# Patient Record
Sex: FEMALE | Race: ASIAN | NOT HISPANIC OR LATINO | URBAN - METROPOLITAN AREA
[De-identification: names, ages, dates, MRNs, and addresses within clinical notes are randomized per-mention and may not be internally consistent; named-entity substitution may affect disease eponyms.]

---

## 2022-08-22 ENCOUNTER — INPATIENT (INPATIENT)
Facility: HOSPITAL | Age: 42
LOS: 2 days | Discharge: HOME | End: 2022-08-25
Attending: OBSTETRICS & GYNECOLOGY | Admitting: OBSTETRICS & GYNECOLOGY
Payer: COMMERCIAL

## 2022-08-22 VITALS — HEART RATE: 76 BPM | SYSTOLIC BLOOD PRESSURE: 177 MMHG | DIASTOLIC BLOOD PRESSURE: 103 MMHG

## 2022-08-22 DIAGNOSIS — Z3A.00 WEEKS OF GESTATION OF PREGNANCY NOT SPECIFIED: ICD-10-CM

## 2022-08-22 DIAGNOSIS — Z98.890 OTHER SPECIFIED POSTPROCEDURAL STATES: Chronic | ICD-10-CM

## 2022-08-22 DIAGNOSIS — Z3A.35 35 WEEKS GESTATION OF PREGNANCY: ICD-10-CM

## 2022-08-22 DIAGNOSIS — Z34.80 ENCOUNTER FOR SUPERVISION OF OTHER NORMAL PREGNANCY, UNSPECIFIED TRIMESTER: ICD-10-CM

## 2022-08-22 DIAGNOSIS — O26.899 OTHER SPECIFIED PREGNANCY RELATED CONDITIONS, UNSPECIFIED TRIMESTER: ICD-10-CM

## 2022-08-22 LAB
ALBUMIN SERPL ELPH-MCNC: 3.8 G/DL — SIGNIFICANT CHANGE UP (ref 3.3–5)
ALBUMIN SERPL ELPH-MCNC: 3.9 G/DL — SIGNIFICANT CHANGE UP (ref 3.3–5)
ALP SERPL-CCNC: 124 U/L — HIGH (ref 40–120)
ALP SERPL-CCNC: 124 U/L — HIGH (ref 40–120)
ALT FLD-CCNC: 14 U/L — SIGNIFICANT CHANGE UP (ref 10–45)
ALT FLD-CCNC: 14 U/L — SIGNIFICANT CHANGE UP (ref 10–45)
ANION GAP SERPL CALC-SCNC: 14 MMOL/L — SIGNIFICANT CHANGE UP (ref 5–17)
ANION GAP SERPL CALC-SCNC: 14 MMOL/L — SIGNIFICANT CHANGE UP (ref 5–17)
APPEARANCE UR: CLEAR — SIGNIFICANT CHANGE UP
APTT BLD: 25.6 SEC — LOW (ref 27.5–35.5)
APTT BLD: 25.7 SEC — LOW (ref 27.5–35.5)
AST SERPL-CCNC: 14 U/L — SIGNIFICANT CHANGE UP (ref 10–40)
AST SERPL-CCNC: 15 U/L — SIGNIFICANT CHANGE UP (ref 10–40)
BASOPHILS # BLD AUTO: 0.02 K/UL — SIGNIFICANT CHANGE UP (ref 0–0.2)
BASOPHILS # BLD AUTO: 0.05 K/UL — SIGNIFICANT CHANGE UP (ref 0–0.2)
BASOPHILS NFR BLD AUTO: 0.1 % — SIGNIFICANT CHANGE UP (ref 0–2)
BASOPHILS NFR BLD AUTO: 0.4 % — SIGNIFICANT CHANGE UP (ref 0–2)
BILIRUB SERPL-MCNC: 0.2 MG/DL — SIGNIFICANT CHANGE UP (ref 0.2–1.2)
BILIRUB SERPL-MCNC: 0.2 MG/DL — SIGNIFICANT CHANGE UP (ref 0.2–1.2)
BILIRUB UR-MCNC: NEGATIVE — SIGNIFICANT CHANGE UP
BLD GP AB SCN SERPL QL: NEGATIVE — SIGNIFICANT CHANGE UP
BUN SERPL-MCNC: 10 MG/DL — SIGNIFICANT CHANGE UP (ref 7–23)
BUN SERPL-MCNC: 12 MG/DL — SIGNIFICANT CHANGE UP (ref 7–23)
CALCIUM SERPL-MCNC: 10.2 MG/DL — SIGNIFICANT CHANGE UP (ref 8.4–10.5)
CALCIUM SERPL-MCNC: 8.5 MG/DL — SIGNIFICANT CHANGE UP (ref 8.4–10.5)
CHLORIDE SERPL-SCNC: 101 MMOL/L — SIGNIFICANT CHANGE UP (ref 96–108)
CHLORIDE SERPL-SCNC: 103 MMOL/L — SIGNIFICANT CHANGE UP (ref 96–108)
CO2 SERPL-SCNC: 20 MMOL/L — LOW (ref 22–31)
CO2 SERPL-SCNC: 21 MMOL/L — LOW (ref 22–31)
COLOR SPEC: COLORLESS — SIGNIFICANT CHANGE UP
COVID-19 SPIKE DOMAIN AB INTERP: POSITIVE
COVID-19 SPIKE DOMAIN ANTIBODY RESULT: >250 U/ML — HIGH
CREAT ?TM UR-MCNC: 18 MG/DL — SIGNIFICANT CHANGE UP
CREAT SERPL-MCNC: 0.53 MG/DL — SIGNIFICANT CHANGE UP (ref 0.5–1.3)
CREAT SERPL-MCNC: 0.67 MG/DL — SIGNIFICANT CHANGE UP (ref 0.5–1.3)
DIFF PNL FLD: NEGATIVE — SIGNIFICANT CHANGE UP
EGFR: 114 ML/MIN/1.73M2 — SIGNIFICANT CHANGE UP
EGFR: 121 ML/MIN/1.73M2 — SIGNIFICANT CHANGE UP
EOSINOPHIL # BLD AUTO: 0 K/UL — SIGNIFICANT CHANGE UP (ref 0–0.5)
EOSINOPHIL # BLD AUTO: 0.12 K/UL — SIGNIFICANT CHANGE UP (ref 0–0.5)
EOSINOPHIL NFR BLD AUTO: 0 % — SIGNIFICANT CHANGE UP (ref 0–6)
EOSINOPHIL NFR BLD AUTO: 1 % — SIGNIFICANT CHANGE UP (ref 0–6)
FIBRINOGEN PPP-MCNC: 602 MG/DL — HIGH (ref 330–520)
FIBRINOGEN PPP-MCNC: 634 MG/DL — HIGH (ref 330–520)
GLUCOSE BLDC GLUCOMTR-MCNC: 135 MG/DL — HIGH (ref 70–99)
GLUCOSE BLDC GLUCOMTR-MCNC: 140 MG/DL — HIGH (ref 70–99)
GLUCOSE BLDC GLUCOMTR-MCNC: 145 MG/DL — HIGH (ref 70–99)
GLUCOSE BLDC GLUCOMTR-MCNC: 164 MG/DL — HIGH (ref 70–99)
GLUCOSE BLDC GLUCOMTR-MCNC: 81 MG/DL — SIGNIFICANT CHANGE UP (ref 70–99)
GLUCOSE SERPL-MCNC: 176 MG/DL — HIGH (ref 70–99)
GLUCOSE SERPL-MCNC: 70 MG/DL — SIGNIFICANT CHANGE UP (ref 70–99)
GLUCOSE UR QL: NEGATIVE — SIGNIFICANT CHANGE UP
HCT VFR BLD CALC: 39.6 % — SIGNIFICANT CHANGE UP (ref 34.5–45)
HCT VFR BLD CALC: 40.5 % — SIGNIFICANT CHANGE UP (ref 34.5–45)
HGB BLD-MCNC: 12.9 G/DL — SIGNIFICANT CHANGE UP (ref 11.5–15.5)
HGB BLD-MCNC: 13.2 G/DL — SIGNIFICANT CHANGE UP (ref 11.5–15.5)
IMM GRANULOCYTES NFR BLD AUTO: 0.7 % — SIGNIFICANT CHANGE UP (ref 0–1.5)
IMM GRANULOCYTES NFR BLD AUTO: 0.8 % — SIGNIFICANT CHANGE UP (ref 0–1.5)
INR BLD: 0.9 RATIO — SIGNIFICANT CHANGE UP (ref 0.88–1.16)
INR BLD: 0.92 RATIO — SIGNIFICANT CHANGE UP (ref 0.88–1.16)
KETONES UR-MCNC: NEGATIVE — SIGNIFICANT CHANGE UP
LDH SERPL L TO P-CCNC: 175 U/L — SIGNIFICANT CHANGE UP (ref 50–242)
LDH SERPL L TO P-CCNC: 185 U/L — SIGNIFICANT CHANGE UP (ref 50–242)
LEUKOCYTE ESTERASE UR-ACNC: NEGATIVE — SIGNIFICANT CHANGE UP
LYMPHOCYTES # BLD AUTO: 0.92 K/UL — LOW (ref 1–3.3)
LYMPHOCYTES # BLD AUTO: 1.95 K/UL — SIGNIFICANT CHANGE UP (ref 1–3.3)
LYMPHOCYTES # BLD AUTO: 16.7 % — SIGNIFICANT CHANGE UP (ref 13–44)
LYMPHOCYTES # BLD AUTO: 6.7 % — LOW (ref 13–44)
MAGNESIUM SERPL-MCNC: 5 MG/DL — HIGH (ref 1.6–2.6)
MCHC RBC-ENTMCNC: 30 PG — SIGNIFICANT CHANGE UP (ref 27–34)
MCHC RBC-ENTMCNC: 30.1 PG — SIGNIFICANT CHANGE UP (ref 27–34)
MCHC RBC-ENTMCNC: 32.6 GM/DL — SIGNIFICANT CHANGE UP (ref 32–36)
MCHC RBC-ENTMCNC: 32.6 GM/DL — SIGNIFICANT CHANGE UP (ref 32–36)
MCV RBC AUTO: 92 FL — SIGNIFICANT CHANGE UP (ref 80–100)
MCV RBC AUTO: 92.3 FL — SIGNIFICANT CHANGE UP (ref 80–100)
MONOCYTES # BLD AUTO: 0.06 K/UL — SIGNIFICANT CHANGE UP (ref 0–0.9)
MONOCYTES # BLD AUTO: 1.02 K/UL — HIGH (ref 0–0.9)
MONOCYTES NFR BLD AUTO: 0.4 % — LOW (ref 2–14)
MONOCYTES NFR BLD AUTO: 8.7 % — SIGNIFICANT CHANGE UP (ref 2–14)
NEUTROPHILS # BLD AUTO: 12.68 K/UL — HIGH (ref 1.8–7.4)
NEUTROPHILS # BLD AUTO: 8.47 K/UL — HIGH (ref 1.8–7.4)
NEUTROPHILS NFR BLD AUTO: 72.5 % — SIGNIFICANT CHANGE UP (ref 43–77)
NEUTROPHILS NFR BLD AUTO: 92 % — HIGH (ref 43–77)
NITRITE UR-MCNC: NEGATIVE — SIGNIFICANT CHANGE UP
NRBC # BLD: 0 /100 WBCS — SIGNIFICANT CHANGE UP (ref 0–0)
NRBC # BLD: 0 /100 WBCS — SIGNIFICANT CHANGE UP (ref 0–0)
PH UR: 6.5 — SIGNIFICANT CHANGE UP (ref 5–8)
PLATELET # BLD AUTO: 330 K/UL — SIGNIFICANT CHANGE UP (ref 150–400)
PLATELET # BLD AUTO: 338 K/UL — SIGNIFICANT CHANGE UP (ref 150–400)
POTASSIUM SERPL-MCNC: 4.1 MMOL/L — SIGNIFICANT CHANGE UP (ref 3.5–5.3)
POTASSIUM SERPL-MCNC: 4.8 MMOL/L — SIGNIFICANT CHANGE UP (ref 3.5–5.3)
POTASSIUM SERPL-SCNC: 4.1 MMOL/L — SIGNIFICANT CHANGE UP (ref 3.5–5.3)
POTASSIUM SERPL-SCNC: 4.8 MMOL/L — SIGNIFICANT CHANGE UP (ref 3.5–5.3)
PROT ?TM UR-MCNC: 21 MG/DL — HIGH (ref 0–12)
PROT SERPL-MCNC: 6.9 G/DL — SIGNIFICANT CHANGE UP (ref 6–8.3)
PROT SERPL-MCNC: 7.1 G/DL — SIGNIFICANT CHANGE UP (ref 6–8.3)
PROT UR-MCNC: ABNORMAL
PROT/CREAT UR-RTO: 1.2 RATIO — HIGH (ref 0–0.2)
PROTHROM AB SERPL-ACNC: 10.3 SEC — LOW (ref 10.5–13.4)
PROTHROM AB SERPL-ACNC: 10.6 SEC — SIGNIFICANT CHANGE UP (ref 10.5–13.4)
RBC # BLD: 4.29 M/UL — SIGNIFICANT CHANGE UP (ref 3.8–5.2)
RBC # BLD: 4.4 M/UL — SIGNIFICANT CHANGE UP (ref 3.8–5.2)
RBC # FLD: 12.9 % — SIGNIFICANT CHANGE UP (ref 10.3–14.5)
RBC # FLD: 13 % — SIGNIFICANT CHANGE UP (ref 10.3–14.5)
RH IG SCN BLD-IMP: POSITIVE — SIGNIFICANT CHANGE UP
RH IG SCN BLD-IMP: POSITIVE — SIGNIFICANT CHANGE UP
SARS-COV-2 IGG+IGM SERPL QL IA: >250 U/ML — HIGH
SARS-COV-2 IGG+IGM SERPL QL IA: POSITIVE
SODIUM SERPL-SCNC: 135 MMOL/L — SIGNIFICANT CHANGE UP (ref 135–145)
SODIUM SERPL-SCNC: 138 MMOL/L — SIGNIFICANT CHANGE UP (ref 135–145)
SP GR SPEC: 1.01 — LOW (ref 1.01–1.02)
T PALLIDUM AB TITR SER: NEGATIVE — SIGNIFICANT CHANGE UP
URATE SERPL-MCNC: 3.6 MG/DL — SIGNIFICANT CHANGE UP (ref 2.5–7)
URATE SERPL-MCNC: 4.3 MG/DL — SIGNIFICANT CHANGE UP (ref 2.5–7)
UROBILINOGEN FLD QL: NEGATIVE — SIGNIFICANT CHANGE UP
WBC # BLD: 11.69 K/UL — HIGH (ref 3.8–10.5)
WBC # BLD: 13.79 K/UL — HIGH (ref 3.8–10.5)
WBC # FLD AUTO: 11.69 K/UL — HIGH (ref 3.8–10.5)
WBC # FLD AUTO: 13.79 K/UL — HIGH (ref 3.8–10.5)

## 2022-08-22 RX ORDER — MAGNESIUM SULFATE 500 MG/ML
2 VIAL (ML) INJECTION
Qty: 40 | Refills: 0 | Status: DISCONTINUED | OUTPATIENT
Start: 2022-08-22 | End: 2022-08-25

## 2022-08-22 RX ORDER — SODIUM CHLORIDE 9 MG/ML
1000 INJECTION, SOLUTION INTRAVENOUS
Refills: 0 | Status: DISCONTINUED | OUTPATIENT
Start: 2022-08-22 | End: 2022-08-23

## 2022-08-22 RX ORDER — LABETALOL HCL 100 MG
300 TABLET ORAL
Refills: 0 | Status: DISCONTINUED | OUTPATIENT
Start: 2022-08-22 | End: 2022-08-25

## 2022-08-22 RX ORDER — LABETALOL HCL 100 MG
200 TABLET ORAL ONCE
Refills: 0 | Status: COMPLETED | OUTPATIENT
Start: 2022-08-22 | End: 2022-08-22

## 2022-08-22 RX ORDER — SODIUM CHLORIDE 9 MG/ML
1000 INJECTION INTRAMUSCULAR; INTRAVENOUS; SUBCUTANEOUS
Refills: 0 | Status: DISCONTINUED | OUTPATIENT
Start: 2022-08-22 | End: 2022-08-23

## 2022-08-22 RX ORDER — AMPICILLIN TRIHYDRATE 250 MG
1 CAPSULE ORAL EVERY 4 HOURS
Refills: 0 | Status: DISCONTINUED | OUTPATIENT
Start: 2022-08-22 | End: 2022-08-23

## 2022-08-22 RX ORDER — LABETALOL HCL 100 MG
20 TABLET ORAL ONCE
Refills: 0 | Status: COMPLETED | OUTPATIENT
Start: 2022-08-22 | End: 2022-08-22

## 2022-08-22 RX ORDER — MAGNESIUM SULFATE 500 MG/ML
4 VIAL (ML) INJECTION ONCE
Refills: 0 | Status: COMPLETED | OUTPATIENT
Start: 2022-08-22 | End: 2022-08-22

## 2022-08-22 RX ORDER — CITRIC ACID/SODIUM CITRATE 300-500 MG
15 SOLUTION, ORAL ORAL EVERY 6 HOURS
Refills: 0 | Status: DISCONTINUED | OUTPATIENT
Start: 2022-08-22 | End: 2022-08-23

## 2022-08-22 RX ORDER — AMPICILLIN TRIHYDRATE 250 MG
2 CAPSULE ORAL ONCE
Refills: 0 | Status: COMPLETED | OUTPATIENT
Start: 2022-08-22 | End: 2022-08-22

## 2022-08-22 RX ORDER — OXYTOCIN 10 UNIT/ML
333.33 VIAL (ML) INJECTION
Qty: 20 | Refills: 0 | Status: DISCONTINUED | OUTPATIENT
Start: 2022-08-22 | End: 2022-08-25

## 2022-08-22 RX ORDER — CHLORHEXIDINE GLUCONATE 213 G/1000ML
1 SOLUTION TOPICAL ONCE
Refills: 0 | Status: DISCONTINUED | OUTPATIENT
Start: 2022-08-22 | End: 2022-08-23

## 2022-08-22 RX ADMIN — Medication 50 GM/HR: at 14:39

## 2022-08-22 RX ADMIN — Medication 300 GRAM(S): at 14:12

## 2022-08-22 RX ADMIN — Medication 12 MILLIGRAM(S): at 14:24

## 2022-08-22 RX ADMIN — Medication 200 MILLIGRAM(S): at 14:26

## 2022-08-22 RX ADMIN — SODIUM CHLORIDE 125 MILLILITER(S): 9 INJECTION, SOLUTION INTRAVENOUS at 14:22

## 2022-08-22 RX ADMIN — Medication 108 GRAM(S): at 22:57

## 2022-08-22 RX ADMIN — Medication 20 MILLIGRAM(S): at 13:40

## 2022-08-22 RX ADMIN — Medication 200 GRAM(S): at 18:57

## 2022-08-22 NOTE — OB PROVIDER H&P - PROBLEM SELECTOR PLAN 1
- Admit to L & D/labs/IVF/NPO/HELLP labs  - Fetal status - NST/TOCO  - GBS uknown  - Betamethasone for FLM  - Maternal Status - Pt with CHTN currently on labetalol 100mg BID last dose this am at 9am, Severe range BP x 2--> Labetalol 20mg IVP given, pt also given labetalol 200mg PO and standing dose changed to labetalol 300mg BID  - Will start magnesium prophylaxis, HELLP labs ordered, serial BP's  - Covid swab ordered  - Consents to be signed  D/W  Dr Tara Benjamin PA-C

## 2022-08-22 NOTE — OB PROVIDER H&P - ATTENDING COMMENTS
Att  41 y0 P0 at 35.3 wks, cHTN managed with Labetalol, sent from office for elev BP and found to have to severe range BP on admission.  No symptoms assoc with BP, reassuring fetal status.  GS today showed concern for SGA with suboptimal growth.  BP's have normalized with anti-HTN meds and labs are normal.  Magnesium started for Sz prophylaxis, and steroids given for FLM.  Reviewed case informally with MFM team, and d/w pt.  Rec stay in hosp for BP mngmt.  Second dose steroids tomorrow.  If BPs remain normal on new regimen and labs remain normal and pt asymptomatic, will then review pros/cons cont observation sec to  GA vs induction of labor sec to concern for siPEC.   Pt expressed understanding.  JERRY Pacheco Att  41 y0 P0 at 35.3 wks, cHTN managed with Labetalol, sent from office for elev BP and found to have to severe range BP on admission.  No symptoms assoc with BP, reassuring fetal status.  GS today showed concern for SGA with suboptimal growth.  BP's have normalized with anti-HTN meds and labs are normal.  Magnesium started for Sz prophylaxis, and steroids given for FLM.  Reviewed case informally with MFM team, and d/w pt.  Rec stay in hosp for BP mngmt.  Second dose steroids tomorrow.  If BPs remain normal on new regimen and labs remain normal and pt asymptomatic, will then review pros/cons cont observation sec to  GA vs induction of labor sec to concern for siPEC.   Pt expressed understanding.  JERRY Pacheco    Addendum:  Labs normal x P/C ration 1.2.  In this setting rec to begin induction tonight as opposed to waiting, pt expressed understanding.

## 2022-08-22 NOTE — OB RN PATIENT PROFILE - CURRENT PREGNANCY COMPLICATIONS, OB PROFILE
Hypertensive Disorder Gestational Diabetes/Gestational Age less than 36 Weeks/Hypertensive Disorder/Intrauterine Growth Restriction/Maternal Unknown GBS

## 2022-08-22 NOTE — CHART NOTE - NSCHARTNOTEFT_GEN_A_CORE
BSUS vtx  SVE 0/0/-3, exam limited 2/2 vaginismus     Plan of care d/w patient to eat meal and then proceed with IOL considering siPEC with PC 1.2 (previously in March 0.17)    d/w Dr. Tara Braun PGY3

## 2022-08-22 NOTE — CHART NOTE - NSCHARTNOTEFT_GEN_A_CORE
Patient discussed during safety rounds. Currently admitted for siPEC on Mg ppx w/ PNC also c/b GDMA1. Patient had elevated FS both prior to induction and soon after beginning induction of labor. Given that the patient received BMZ earlier today and ate dinner prior to starting induction, there is an explanation for elevated FS. However, fetal exposure to inc blood sugar still has implications. After discussion with safety officers and MFM Fellow on call, will proceed with q1hr FS x3 and should FS normalized (<120) will continue with routine policy. Should FS be above threshold, will proceed with insulin drip as per policy.     Mvula PGY3

## 2022-08-22 NOTE — OB PROVIDER H&P - HISTORY OF PRESENT ILLNESS
41y P0   @  35w 3d   presents from office for evaluation secondary to h/o CHTN on labetalol 100mg BID, with BP today in the office of 150/103.  Per pt over the past week she has had a few elevated BP's at home, denies HA, visual changes or epigastric pain  Denies contractions, VB or LOF has + FM    PNC: Dr Pacheco  PNI: CHTN - diagnosed  during the 1st trimester and started on labetalol 100mg BID at that time  GDMA1 - well controlled per patient  IUGR - Ultrasound today showed IUGR 4lbs 12 oz 7%, AC 4% suboptimal growth  PNL: GBS unknown      All: No Known Allergies  Meds: Labetalol 100mg BID, ASA  PMHx: CHTN, GDMA1  PSHx: LSC ov cystectomy  Socialhx: Denies x 3  OBhx: Primigravid  GYNhx: h/o ov cysts, denies fibroids, or STDs    HR: 75 (08-22-22 @ 13:54) (66 - 84)  BP: 141/95 (08-22-22 @ 13:48) (141/95 - 180/108)  SpO2: 99% (08-22-22 @ 13:54) (98% - 100%)  Gen: NAD  Heart: RRR  Lungs: CTA B/L  Abdomen: Gravid, NT  Ext: no calf tenderness    NST: 140's reactive  TOCO:rare ctx  VE: deferred  EFW: 4lbs 12 oz      A/P 41y P0   @  35w 3 d  Admitted for CHTN with new onset severe range BP's suspect SIPEC  - Admit to L & D/labs/IVF/NPO  - Fetal status - NST/TOCO  - GBS uknown  - Betamethasone for FLM  - Maternal Status - Pt with CHTN currently on labetalol 100mg BID last dose this am at 9am, Severe range BP x 2--> Labetalol 20mg IVP given  - WIll start magnesium prophylaxis  - Covid swab ordered  - Consents to be signed  D/W  Dr Tara Benjamin PA-C     41y P0   @  35w 3d   presents from office for evaluation secondary to h/o CHTN on labetalol 100mg BID, with BP today in the office of 150/103.  Per pt over the past week she has had a few elevated BP's at home, denies HA, visual changes or epigastric pain  Denies contractions, VB or LOF has + FM    PNC: Dr Pacheco  PNI: CHTN - diagnosed  during the 1st trimester and started on labetalol 100mg BID at that time  GDMA1 - well controlled per patient  IUGR - Ultrasound today showed IUGR 4lbs 12 oz 7%, AC 4% suboptimal growth  PNL: GBS unknown      All: No Known Allergies  Meds: Labetalol 100mg BID, ASA  PMHx: CHTN, GDMA1  PSHx: LSC ov cystectomy  Socialhx: Denies x 3  OBhx: Primigravid  GYNhx: h/o ov cysts, denies fibroids, or STDs    HR: 75 (08-22-22 @ 13:54) (66 - 84)  BP: 141/95 (08-22-22 @ 13:48) (141/95 - 180/108)  SpO2: 99% (08-22-22 @ 13:54) (98% - 100%)  Gen: NAD  Heart: RRR  Lungs: CTA B/L  Abdomen: Gravid, NT  Ext: no calf tenderness    NST: 140's reactive  TOCO:rare ctx  VE: deferred  EFW: 4lbs 12 oz

## 2022-08-23 LAB
ALBUMIN SERPL ELPH-MCNC: 3.6 G/DL — SIGNIFICANT CHANGE UP (ref 3.3–5)
ALP SERPL-CCNC: 119 U/L — SIGNIFICANT CHANGE UP (ref 40–120)
ALT FLD-CCNC: 13 U/L — SIGNIFICANT CHANGE UP (ref 10–45)
ANION GAP SERPL CALC-SCNC: 14 MMOL/L — SIGNIFICANT CHANGE UP (ref 5–17)
APTT BLD: 25.4 SEC — LOW (ref 27.5–35.5)
AST SERPL-CCNC: 16 U/L — SIGNIFICANT CHANGE UP (ref 10–40)
BASOPHILS # BLD AUTO: 0.01 K/UL — SIGNIFICANT CHANGE UP (ref 0–0.2)
BASOPHILS NFR BLD AUTO: 0.1 % — SIGNIFICANT CHANGE UP (ref 0–2)
BILIRUB SERPL-MCNC: 0.2 MG/DL — SIGNIFICANT CHANGE UP (ref 0.2–1.2)
BUN SERPL-MCNC: 11 MG/DL — SIGNIFICANT CHANGE UP (ref 7–23)
CALCIUM SERPL-MCNC: 7.5 MG/DL — LOW (ref 8.4–10.5)
CHLORIDE SERPL-SCNC: 102 MMOL/L — SIGNIFICANT CHANGE UP (ref 96–108)
CO2 SERPL-SCNC: 18 MMOL/L — LOW (ref 22–31)
CREAT SERPL-MCNC: 0.57 MG/DL — SIGNIFICANT CHANGE UP (ref 0.5–1.3)
EGFR: 118 ML/MIN/1.73M2 — SIGNIFICANT CHANGE UP
EOSINOPHIL # BLD AUTO: 0 K/UL — SIGNIFICANT CHANGE UP (ref 0–0.5)
EOSINOPHIL NFR BLD AUTO: 0 % — SIGNIFICANT CHANGE UP (ref 0–6)
FIBRINOGEN PPP-MCNC: 604 MG/DL — HIGH (ref 330–520)
GLUCOSE BLDC GLUCOMTR-MCNC: 101 MG/DL — HIGH (ref 70–99)
GLUCOSE BLDC GLUCOMTR-MCNC: 103 MG/DL — HIGH (ref 70–99)
GLUCOSE BLDC GLUCOMTR-MCNC: 104 MG/DL — HIGH (ref 70–99)
GLUCOSE BLDC GLUCOMTR-MCNC: 106 MG/DL — HIGH (ref 70–99)
GLUCOSE BLDC GLUCOMTR-MCNC: 108 MG/DL — HIGH (ref 70–99)
GLUCOSE BLDC GLUCOMTR-MCNC: 110 MG/DL — HIGH (ref 70–99)
GLUCOSE BLDC GLUCOMTR-MCNC: 118 MG/DL — HIGH (ref 70–99)
GLUCOSE BLDC GLUCOMTR-MCNC: 121 MG/DL — HIGH (ref 70–99)
GLUCOSE BLDC GLUCOMTR-MCNC: 122 MG/DL — HIGH (ref 70–99)
GLUCOSE BLDC GLUCOMTR-MCNC: 124 MG/DL — HIGH (ref 70–99)
GLUCOSE BLDC GLUCOMTR-MCNC: 124 MG/DL — HIGH (ref 70–99)
GLUCOSE BLDC GLUCOMTR-MCNC: 125 MG/DL — HIGH (ref 70–99)
GLUCOSE BLDC GLUCOMTR-MCNC: 130 MG/DL — HIGH (ref 70–99)
GLUCOSE BLDC GLUCOMTR-MCNC: 140 MG/DL — HIGH (ref 70–99)
GLUCOSE BLDC GLUCOMTR-MCNC: 142 MG/DL — HIGH (ref 70–99)
GLUCOSE BLDC GLUCOMTR-MCNC: 153 MG/DL — HIGH (ref 70–99)
GLUCOSE BLDC GLUCOMTR-MCNC: 97 MG/DL — SIGNIFICANT CHANGE UP (ref 70–99)
GLUCOSE SERPL-MCNC: 134 MG/DL — HIGH (ref 70–99)
HCT VFR BLD CALC: 32.9 % — LOW (ref 34.5–45)
HCT VFR BLD CALC: 38.7 % — SIGNIFICANT CHANGE UP (ref 34.5–45)
HGB BLD-MCNC: 10.8 G/DL — LOW (ref 11.5–15.5)
HGB BLD-MCNC: 12.8 G/DL — SIGNIFICANT CHANGE UP (ref 11.5–15.5)
IMM GRANULOCYTES NFR BLD AUTO: 0.9 % — SIGNIFICANT CHANGE UP (ref 0–1.5)
INR BLD: 0.91 RATIO — SIGNIFICANT CHANGE UP (ref 0.88–1.16)
LDH SERPL L TO P-CCNC: 202 U/L — SIGNIFICANT CHANGE UP (ref 50–242)
LYMPHOCYTES # BLD AUTO: 1.21 K/UL — SIGNIFICANT CHANGE UP (ref 1–3.3)
LYMPHOCYTES # BLD AUTO: 9.6 % — LOW (ref 13–44)
MAGNESIUM SERPL-MCNC: 5.4 MG/DL — HIGH (ref 1.6–2.6)
MAGNESIUM SERPL-MCNC: 5.6 MG/DL — HIGH (ref 1.6–2.6)
MAGNESIUM SERPL-MCNC: 5.6 MG/DL — HIGH (ref 1.6–2.6)
MAGNESIUM SERPL-MCNC: 6.4 MG/DL — HIGH (ref 1.6–2.6)
MCHC RBC-ENTMCNC: 29.9 PG — SIGNIFICANT CHANGE UP (ref 27–34)
MCHC RBC-ENTMCNC: 30.1 PG — SIGNIFICANT CHANGE UP (ref 27–34)
MCHC RBC-ENTMCNC: 32.8 GM/DL — SIGNIFICANT CHANGE UP (ref 32–36)
MCHC RBC-ENTMCNC: 33.1 GM/DL — SIGNIFICANT CHANGE UP (ref 32–36)
MCV RBC AUTO: 90.4 FL — SIGNIFICANT CHANGE UP (ref 80–100)
MCV RBC AUTO: 91.6 FL — SIGNIFICANT CHANGE UP (ref 80–100)
MONOCYTES # BLD AUTO: 0.3 K/UL — SIGNIFICANT CHANGE UP (ref 0–0.9)
MONOCYTES NFR BLD AUTO: 2.4 % — SIGNIFICANT CHANGE UP (ref 2–14)
NEUTROPHILS # BLD AUTO: 11.01 K/UL — HIGH (ref 1.8–7.4)
NEUTROPHILS NFR BLD AUTO: 87 % — HIGH (ref 43–77)
NRBC # BLD: 0 /100 WBCS — SIGNIFICANT CHANGE UP (ref 0–0)
NRBC # BLD: 0 /100 WBCS — SIGNIFICANT CHANGE UP (ref 0–0)
PLATELET # BLD AUTO: 288 K/UL — SIGNIFICANT CHANGE UP (ref 150–400)
PLATELET # BLD AUTO: 317 K/UL — SIGNIFICANT CHANGE UP (ref 150–400)
POTASSIUM SERPL-MCNC: 4.1 MMOL/L — SIGNIFICANT CHANGE UP (ref 3.5–5.3)
POTASSIUM SERPL-SCNC: 4.1 MMOL/L — SIGNIFICANT CHANGE UP (ref 3.5–5.3)
PROT SERPL-MCNC: 6.9 G/DL — SIGNIFICANT CHANGE UP (ref 6–8.3)
PROTHROM AB SERPL-ACNC: 10.5 SEC — SIGNIFICANT CHANGE UP (ref 10.5–13.4)
RBC # BLD: 3.59 M/UL — LOW (ref 3.8–5.2)
RBC # BLD: 4.28 M/UL — SIGNIFICANT CHANGE UP (ref 3.8–5.2)
RBC # FLD: 13.1 % — SIGNIFICANT CHANGE UP (ref 10.3–14.5)
RBC # FLD: 13.1 % — SIGNIFICANT CHANGE UP (ref 10.3–14.5)
SODIUM SERPL-SCNC: 134 MMOL/L — LOW (ref 135–145)
URATE SERPL-MCNC: 4.6 MG/DL — SIGNIFICANT CHANGE UP (ref 2.5–7)
URATE SERPL-MCNC: 4.8 MG/DL — SIGNIFICANT CHANGE UP (ref 2.5–7)
WBC # BLD: 12.65 K/UL — HIGH (ref 3.8–10.5)
WBC # BLD: 31.51 K/UL — HIGH (ref 3.8–10.5)
WBC # FLD AUTO: 12.65 K/UL — HIGH (ref 3.8–10.5)
WBC # FLD AUTO: 31.51 K/UL — HIGH (ref 3.8–10.5)

## 2022-08-23 PROCEDURE — 88305 TISSUE EXAM BY PATHOLOGIST: CPT | Mod: 26

## 2022-08-23 PROCEDURE — 99221 1ST HOSP IP/OBS SF/LOW 40: CPT

## 2022-08-23 PROCEDURE — 88307 TISSUE EXAM BY PATHOLOGIST: CPT | Mod: 26

## 2022-08-23 RX ORDER — INSULIN LISPRO 100/ML
1 VIAL (ML) SUBCUTANEOUS ONCE
Refills: 0 | Status: DISCONTINUED | OUTPATIENT
Start: 2022-08-23 | End: 2022-08-23

## 2022-08-23 RX ORDER — IBUPROFEN 200 MG
600 TABLET ORAL EVERY 6 HOURS
Refills: 0 | Status: COMPLETED | OUTPATIENT
Start: 2022-08-23 | End: 2023-07-22

## 2022-08-23 RX ORDER — DEXTROSE 50 % IN WATER 50 %
50 SYRINGE (ML) INTRAVENOUS
Refills: 0 | Status: DISCONTINUED | OUTPATIENT
Start: 2022-08-23 | End: 2022-08-25

## 2022-08-23 RX ORDER — SODIUM CHLORIDE 9 MG/ML
3 INJECTION INTRAMUSCULAR; INTRAVENOUS; SUBCUTANEOUS EVERY 8 HOURS
Refills: 0 | Status: DISCONTINUED | OUTPATIENT
Start: 2022-08-23 | End: 2022-08-25

## 2022-08-23 RX ORDER — HYDROCORTISONE 1 %
1 OINTMENT (GRAM) TOPICAL EVERY 6 HOURS
Refills: 0 | Status: DISCONTINUED | OUTPATIENT
Start: 2022-08-23 | End: 2022-08-25

## 2022-08-23 RX ORDER — DIPHENHYDRAMINE HCL 50 MG
25 CAPSULE ORAL EVERY 6 HOURS
Refills: 0 | Status: DISCONTINUED | OUTPATIENT
Start: 2022-08-23 | End: 2022-08-25

## 2022-08-23 RX ORDER — MAGNESIUM HYDROXIDE 400 MG/1
30 TABLET, CHEWABLE ORAL
Refills: 0 | Status: DISCONTINUED | OUTPATIENT
Start: 2022-08-23 | End: 2022-08-25

## 2022-08-23 RX ORDER — ACETAMINOPHEN 500 MG
975 TABLET ORAL
Refills: 0 | Status: DISCONTINUED | OUTPATIENT
Start: 2022-08-23 | End: 2022-08-25

## 2022-08-23 RX ORDER — OXYTOCIN 10 UNIT/ML
333.33 VIAL (ML) INJECTION
Qty: 20 | Refills: 0 | Status: DISCONTINUED | OUTPATIENT
Start: 2022-08-23 | End: 2022-08-25

## 2022-08-23 RX ORDER — PRAMOXINE HYDROCHLORIDE 150 MG/15G
1 AEROSOL, FOAM RECTAL EVERY 4 HOURS
Refills: 0 | Status: DISCONTINUED | OUTPATIENT
Start: 2022-08-23 | End: 2022-08-25

## 2022-08-23 RX ORDER — TETANUS TOXOID, REDUCED DIPHTHERIA TOXOID AND ACELLULAR PERTUSSIS VACCINE, ADSORBED 5; 2.5; 8; 8; 2.5 [IU]/.5ML; [IU]/.5ML; UG/.5ML; UG/.5ML; UG/.5ML
0.5 SUSPENSION INTRAMUSCULAR ONCE
Refills: 0 | Status: DISCONTINUED | OUTPATIENT
Start: 2022-08-23 | End: 2022-08-25

## 2022-08-23 RX ORDER — BENZOCAINE 10 %
1 GEL (GRAM) MUCOUS MEMBRANE EVERY 6 HOURS
Refills: 0 | Status: DISCONTINUED | OUTPATIENT
Start: 2022-08-23 | End: 2022-08-25

## 2022-08-23 RX ORDER — INSULIN HUMAN 100 [IU]/ML
1 INJECTION, SOLUTION SUBCUTANEOUS ONCE
Refills: 0 | Status: COMPLETED | OUTPATIENT
Start: 2022-08-23 | End: 2022-08-23

## 2022-08-23 RX ORDER — DEXTROSE 50 % IN WATER 50 %
25 SYRINGE (ML) INTRAVENOUS
Refills: 0 | Status: DISCONTINUED | OUTPATIENT
Start: 2022-08-23 | End: 2022-08-25

## 2022-08-23 RX ORDER — LANOLIN
1 OINTMENT (GRAM) TOPICAL EVERY 6 HOURS
Refills: 0 | Status: DISCONTINUED | OUTPATIENT
Start: 2022-08-23 | End: 2022-08-25

## 2022-08-23 RX ORDER — SIMETHICONE 80 MG/1
80 TABLET, CHEWABLE ORAL EVERY 4 HOURS
Refills: 0 | Status: DISCONTINUED | OUTPATIENT
Start: 2022-08-23 | End: 2022-08-25

## 2022-08-23 RX ORDER — KETOROLAC TROMETHAMINE 30 MG/ML
30 SYRINGE (ML) INJECTION ONCE
Refills: 0 | Status: DISCONTINUED | OUTPATIENT
Start: 2022-08-23 | End: 2022-08-23

## 2022-08-23 RX ORDER — DIBUCAINE 1 %
1 OINTMENT (GRAM) RECTAL EVERY 6 HOURS
Refills: 0 | Status: DISCONTINUED | OUTPATIENT
Start: 2022-08-23 | End: 2022-08-25

## 2022-08-23 RX ORDER — OXYCODONE HYDROCHLORIDE 5 MG/1
5 TABLET ORAL ONCE
Refills: 0 | Status: DISCONTINUED | OUTPATIENT
Start: 2022-08-23 | End: 2022-08-25

## 2022-08-23 RX ORDER — INSULIN HUMAN 100 [IU]/ML
1 INJECTION, SOLUTION SUBCUTANEOUS
Qty: 100 | Refills: 0 | Status: DISCONTINUED | OUTPATIENT
Start: 2022-08-23 | End: 2022-08-25

## 2022-08-23 RX ORDER — AER TRAVELER 0.5 G/1
1 SOLUTION RECTAL; TOPICAL EVERY 4 HOURS
Refills: 0 | Status: DISCONTINUED | OUTPATIENT
Start: 2022-08-23 | End: 2022-08-25

## 2022-08-23 RX ORDER — OXYCODONE HYDROCHLORIDE 5 MG/1
5 TABLET ORAL
Refills: 0 | Status: DISCONTINUED | OUTPATIENT
Start: 2022-08-23 | End: 2022-08-25

## 2022-08-23 RX ADMIN — Medication 300 MILLIGRAM(S): at 14:16

## 2022-08-23 RX ADMIN — Medication 30 MILLIGRAM(S): at 21:14

## 2022-08-23 RX ADMIN — Medication 108 GRAM(S): at 06:56

## 2022-08-23 RX ADMIN — Medication 12 MILLIGRAM(S): at 14:34

## 2022-08-23 RX ADMIN — INSULIN HUMAN 1 UNIT(S): 100 INJECTION, SOLUTION SUBCUTANEOUS at 01:06

## 2022-08-23 RX ADMIN — Medication 108 GRAM(S): at 02:57

## 2022-08-23 RX ADMIN — Medication 108 GRAM(S): at 11:09

## 2022-08-23 RX ADMIN — INSULIN HUMAN 1 UNIT(S)/HR: 100 INJECTION, SOLUTION SUBCUTANEOUS at 01:08

## 2022-08-23 RX ADMIN — Medication 108 GRAM(S): at 14:45

## 2022-08-23 RX ADMIN — Medication 300 MILLIGRAM(S): at 02:20

## 2022-08-23 NOTE — OB PROVIDER LABOR PROGRESS NOTE - ASSESSMENT
iup at 35.4 with chtn ,gdm1 with superimposed pec went quickly from closed to FD-arom clear fluid  since epidural having subtle lates /50 which is lower than she has been -  phenylephrine given and now bp 124/77  fhr now in 120's  ifm applied  will follow closely  amp for gbs unknown

## 2022-08-23 NOTE — OB RN DELIVERY SUMMARY - NS_SEPSISRSKCALC_OBGYN_ALL_OB_FT
EOS calculated successfully. EOS Risk Factor: 0.5/1000 live births ([facility] national incidence); GA=35w4d; Temp=98.6; ROM=0.633; GBS='Unknown'; Antibiotics='Broad spectrum antibiotics > 4 hrs prior to birth'

## 2022-08-23 NOTE — OB PROVIDER DELIVERY SUMMARY - NSPROVIDERDELIVERYNOTE_OBGYN_ALL_OB_FT
FHR with decel to 90 bpm -on exam copiuous amounts of port wine fluid noted which was a new finding  -dx of  suspected abruption made.Pt pushed baby down to +2 . FHR down in 60bpm -or room opened but decision made to attempt vaccuum extraction. Despite efw being less than 2500 which is the recommended but not contraindicated wt for vaccuum use it was used to expedite the delivery as a c/s would have taken much longer. RML cut to also expedite delivery. With second pull  baby delivered  nuchal around neck and body noted. placenta spont with  30 % abruption noted -  Apgars 9,9   baby to nicu    ebl 500cc  atony noted  cytotec per rectum 1000ug  im pitocin given  rml repaired with 2.0 and 3.0 chromic sutures  rectum intact

## 2022-08-23 NOTE — OB NEONATOLOGY/PEDIATRICIAN DELIVERY SUMMARY - NSPEDSNEONOTESA_OBGYN_ALL_OB_FT
Baby is a 35.4 GA female born to a 42yo  via VAVD. Peds called for premature delivery, vacuum delivery. Pregnancy hx notable for IOL for PEC on Mg, GDMA1 on insulin. No other Maternal hx. MBT A+. PNL neg/nr/imm. GBS unknown, amp x6. Maternal tmax 36.8C. AROM 16:09 1 hr prior to delivery w/ clear fluids. At delivery, placenta noted to be abrupted. Baby born vigorous and crying spontaneously, no DCC. WDSS. Apgars 7/9. EOS: 0.07. Baby admitted to NICU on RA for prematurity and BW < 5 pounds. Attending Dr. Abdi at present at delivery. Baby temp 36.5C  Mom wants breast and formula, wants hepB vaccine.

## 2022-08-23 NOTE — CONSULT NOTE PEDS - SUBJECTIVE AND OBJECTIVE BOX
35 weeks   Ms. Woods  is a 40 y/o  admitted at 35 w 4d gestational age.  Maternal history notable for ovarian cyst, and prenatal history notable for HTN, onset with this pregnancy, HELLP labs neg, GDMA well controlled with diet.  Most recent EFW 2155gms, IUGR, on 2022.  Mother was admitted  from OB office visit on 22 for IOL secondary to HTN with /103. Received BMZ x1 on 22 and was started on Ampicillin for GBS unknown.    I met with Ms. Woods and her partner and discussed what to expect should she deliver at 35  weeks gestation. We discussed the following:    The NICU team will be present at her delivery and will immediately assess and care for her infant.  The infant may require respiratory support, most commonly in the form of nasal CPAP. While unlikely, there is a small possibility that the infant would require intubation and mechanical ventilation.  Depending on the clinical status of the infant, enteral feedings may or may not be started immediately. The infant will receive IVF/IV nutrition as necessary. Due to immature suck/swallow, orogastric or nasogastric tube may be required once feeds are initiated.  The infant is also at risk for hypoglycemia due to prematurity.  Discussed the benefits of breastfeeding in  infants and encouraged mother to pump following delivery.  The infant will be at risk for jaundice which can be treated with phototherapy.  The infant will be screened for infection and treated with antibiotics if deemed clinically necessary.  The infant is at risk for thermoregulation issues.  The infant is at risk for developmental delays as a consequence of prematurity. The infant will be evaluated by a developmental pediatrician to monitor for neurodevelopmental delays.  Length of stay is highly variable, but at this gestational age, will be monitored in the NICU until she is physiologically stable and  may be able to be transferred to [facility] at that time.  Reviewed discharge criteria.    Ms. Woods  and her partner had the opportunity to ask questions and may contact the NICU at any time if further questions arise.

## 2022-08-23 NOTE — OB RN DELIVERY SUMMARY - NS_NEWBORNRN1_OBGYN_ALL_OB_FT
Stephanie Shay is a 67 year old female presenting with follow up to blood pressure. Patient states she is taking her medication as directed. Patient states the Fosamax is causing her constipation and gas. Patient states she has noticed weight gain as well.  Patient did get lab work done prior to appointment, results given to patient to look over and ask Mary any questions.     Denies known Latex allergy or symptoms of Latex sensitivity.  Medications verified, no changes.    Health Maintenance Due   Topic Date Due   • Colorectal Cancer Screen-  Never done   • Shingles Vaccine (1 of 2) Never done   • Breast Cancer Screening  10/29/2017   • Depression Screening  10/23/2021          Maged [id number]

## 2022-08-23 NOTE — PRE-ANESTHESIA EVALUATION ADULT - NSANTHSUBSTSD_GEN_ALL_CORE
CERTIFICATE OF SCHOOL      December 24, 2019      RE:   Kana Louie  [address]  [place] [address]    To whom it may concern:    This is to certify that Kana Louie has been under Glendy Guzman NP's care from 12/24/2019 and is excused from school on 12/16/19 and 12/17/19.    RESTRICTIONS:     REMARKS:       SIGNATURE:___________________________________________,   12/24/2019  Marilee Mitchell CMA/Glendy Guzman NP   [facility]  [address]  [place] [address] [phone]   No

## 2022-08-23 NOTE — CHART NOTE - NSCHARTNOTEFT_GEN_A_CORE
R3 Tracing note     FHT Baseline 125, moderated variability, +accels, -decels  [place]: q5-7 min ctx     Fetal status reassuring at this time  Continue IOL w/ PO cytotec for siPEC at this time     Paris Braun PGY3 R3 Tracing note     FHT Baseline 125, moderated variability, +accels, -decels  [place]: q5-7 min ctx     A/P  41y P0 @ 35w 4d IOL siPEC on Magnesium   - Fetal status reassuring at this time  - Continue IOL w/ PO cytotec for siPEC at this time   - Continue Labetalol 300 BID  - BMZ for fetal lung maturity (8/22-)  - anesthesia consult prn   - continue close BP monitoring     Paris Braun PGY3

## 2022-08-23 NOTE — CHART NOTE - NSCHARTNOTEFT_GEN_A_CORE
Patient seen at bedside for provider clearance prior to transfer to floor 2/2 multiple uterotonics given during delivery.     Gen: NAD  Abd: fundal firm, below umbilicus  SVE: lochia wnl  LE: trace edema    siPEC and A2 iol s/p VAVD c/b suspected abruption  - con't Mg for seizure ppx  - CBC resulted, repeat in AM  - s/p Methergine IM, pit 10u IM  - Reg diet  - IVF  - epidural removed    Mvula PGY3

## 2022-08-23 NOTE — OB PROVIDER DELIVERY SUMMARY - NSSELHIDDEN_OBGYN_ALL_OB_FT
[NS_DeliveryAttending1_OBGYN_ALL_OB_FT:[id number]],[NS_DeliveryRN_OBGYN_ALL_OB_FT:[id number]]

## 2022-08-23 NOTE — OB PROVIDER LABOR PROGRESS NOTE - ASSESSMENT
A/P:  -EFM/[place]by  -Anticipate   D/w Dr. Neetu Veloz PA-C A/P:  -EFM/Viola  -Anticipate   D/w Dr. De Anda who is coming in to [facility]

## 2022-08-23 NOTE — OB RN DELIVERY SUMMARY - NSSELHIDDEN_OBGYN_ALL_OB_FT
[NS_DeliveryAttending1_OBGYN_ALL_OB_FT:[id number]],[NS_DeliveryRN_OBGYN_ALL_OB_FT:[id number]]

## 2022-08-23 NOTE — OB PROVIDER LABOR PROGRESS NOTE - NS_SUBJECTIVE/OBJECTIVE_OBGYN_ALL_OB_FT
chtn with superimposed pec
subtle late decelerations since epidural
PA Note:  Patient seen and evaluated for vaginal exam. Patient comfortable s/p epidural.

## 2022-08-23 NOTE — OB PROVIDER LABOR PROGRESS NOTE - ASSESSMENT
i 42 yo p0  at 35.4 weeks chtn with superimposed pec and gdm1 with suboptimal growth erw 4lb 12 oz  on mgso4  insulin drip -sugars were high post betamethasone  labetalol 300 bid--bp132/79  po ctytec and cervical balloon when possible

## 2022-08-23 NOTE — OB PROVIDER LABOR PROGRESS NOTE - NS_OBIHIFHRDETAILS_OBGYN_ALL_OB_FT
reactive
120/moderate variability/no accels/intermittent late decelerations
subtle decellerations since epidural

## 2022-08-23 NOTE — OB RN DELIVERY SUMMARY - NS_LABORCHARACTER_OBGYN_ALL_OB
Induction of labor-AROM/Augmentation of labor/External electronic FM/Fetal intolerance Induction of labor-AROM/Augmentation of labor/Other - excessive bleeding/External electronic FM/Fetal intolerance

## 2022-08-24 LAB
ALBUMIN SERPL ELPH-MCNC: 2.9 G/DL — LOW (ref 3.3–5)
ALBUMIN SERPL ELPH-MCNC: 3 G/DL — LOW (ref 3.3–5)
ALP SERPL-CCNC: 88 U/L — SIGNIFICANT CHANGE UP (ref 40–120)
ALP SERPL-CCNC: 92 U/L — SIGNIFICANT CHANGE UP (ref 40–120)
ALT FLD-CCNC: 11 U/L — SIGNIFICANT CHANGE UP (ref 10–45)
ALT FLD-CCNC: 11 U/L — SIGNIFICANT CHANGE UP (ref 10–45)
ANION GAP SERPL CALC-SCNC: 11 MMOL/L — SIGNIFICANT CHANGE UP (ref 5–17)
ANION GAP SERPL CALC-SCNC: 12 MMOL/L — SIGNIFICANT CHANGE UP (ref 5–17)
APTT BLD: 23.3 SEC — LOW (ref 27.5–35.5)
APTT BLD: 23.7 SEC — LOW (ref 27.5–35.5)
AST SERPL-CCNC: 18 U/L — SIGNIFICANT CHANGE UP (ref 10–40)
AST SERPL-CCNC: 20 U/L — SIGNIFICANT CHANGE UP (ref 10–40)
BASOPHILS # BLD AUTO: 0 K/UL — SIGNIFICANT CHANGE UP (ref 0–0.2)
BASOPHILS # BLD AUTO: 0.04 K/UL — SIGNIFICANT CHANGE UP (ref 0–0.2)
BASOPHILS NFR BLD AUTO: 0 % — SIGNIFICANT CHANGE UP (ref 0–2)
BASOPHILS NFR BLD AUTO: 0.1 % — SIGNIFICANT CHANGE UP (ref 0–2)
BILIRUB SERPL-MCNC: 0.2 MG/DL — SIGNIFICANT CHANGE UP (ref 0.2–1.2)
BILIRUB SERPL-MCNC: 0.2 MG/DL — SIGNIFICANT CHANGE UP (ref 0.2–1.2)
BUN SERPL-MCNC: 17 MG/DL — SIGNIFICANT CHANGE UP (ref 7–23)
BUN SERPL-MCNC: 18 MG/DL — SIGNIFICANT CHANGE UP (ref 7–23)
CALCIUM SERPL-MCNC: 6.8 MG/DL — LOW (ref 8.4–10.5)
CALCIUM SERPL-MCNC: 6.8 MG/DL — LOW (ref 8.4–10.5)
CHLORIDE SERPL-SCNC: 100 MMOL/L — SIGNIFICANT CHANGE UP (ref 96–108)
CHLORIDE SERPL-SCNC: 101 MMOL/L — SIGNIFICANT CHANGE UP (ref 96–108)
CO2 SERPL-SCNC: 20 MMOL/L — LOW (ref 22–31)
CO2 SERPL-SCNC: 21 MMOL/L — LOW (ref 22–31)
CREAT SERPL-MCNC: 0.57 MG/DL — SIGNIFICANT CHANGE UP (ref 0.5–1.3)
CREAT SERPL-MCNC: 0.66 MG/DL — SIGNIFICANT CHANGE UP (ref 0.5–1.3)
EGFR: 114 ML/MIN/1.73M2 — SIGNIFICANT CHANGE UP
EGFR: 118 ML/MIN/1.73M2 — SIGNIFICANT CHANGE UP
EOSINOPHIL # BLD AUTO: 0 K/UL — SIGNIFICANT CHANGE UP (ref 0–0.5)
EOSINOPHIL # BLD AUTO: 0 K/UL — SIGNIFICANT CHANGE UP (ref 0–0.5)
EOSINOPHIL NFR BLD AUTO: 0 % — SIGNIFICANT CHANGE UP (ref 0–6)
EOSINOPHIL NFR BLD AUTO: 0 % — SIGNIFICANT CHANGE UP (ref 0–6)
FIBRINOGEN PPP-MCNC: 539 MG/DL — HIGH (ref 330–520)
GLUCOSE SERPL-MCNC: 123 MG/DL — HIGH (ref 70–99)
GLUCOSE SERPL-MCNC: 151 MG/DL — HIGH (ref 70–99)
GROUP B BETA STREP DNA (PCR): SIGNIFICANT CHANGE UP
GROUP B BETA STREP INTERPRETATION: SIGNIFICANT CHANGE UP
HCT VFR BLD CALC: 29 % — LOW (ref 34.5–45)
HCT VFR BLD CALC: 31.1 % — LOW (ref 34.5–45)
HGB BLD-MCNC: 10.1 G/DL — LOW (ref 11.5–15.5)
HGB BLD-MCNC: 9.8 G/DL — LOW (ref 11.5–15.5)
IMM GRANULOCYTES NFR BLD AUTO: 1 % — SIGNIFICANT CHANGE UP (ref 0–1.5)
INR BLD: 0.88 RATIO — SIGNIFICANT CHANGE UP (ref 0.88–1.16)
INR BLD: 0.9 RATIO — SIGNIFICANT CHANGE UP (ref 0.88–1.16)
LDH SERPL L TO P-CCNC: 228 U/L — SIGNIFICANT CHANGE UP (ref 50–242)
LDH SERPL L TO P-CCNC: 234 U/L — SIGNIFICANT CHANGE UP (ref 50–242)
LG PLATELETS BLD QL AUTO: SLIGHT — SIGNIFICANT CHANGE UP
LYMPHOCYTES # BLD AUTO: 0.62 K/UL — LOW (ref 1–3.3)
LYMPHOCYTES # BLD AUTO: 1.5 K/UL — SIGNIFICANT CHANGE UP (ref 1–3.3)
LYMPHOCYTES # BLD AUTO: 2 % — LOW (ref 13–44)
LYMPHOCYTES # BLD AUTO: 5.3 % — LOW (ref 13–44)
MAGNESIUM SERPL-MCNC: 5.6 MG/DL — HIGH (ref 1.6–2.6)
MAGNESIUM SERPL-MCNC: 5.8 MG/DL — HIGH (ref 1.6–2.6)
MAGNESIUM SERPL-MCNC: 6.6 MG/DL — HIGH (ref 1.6–2.6)
MANUAL SMEAR VERIFICATION: SIGNIFICANT CHANGE UP
MCHC RBC-ENTMCNC: 30 PG — SIGNIFICANT CHANGE UP (ref 27–34)
MCHC RBC-ENTMCNC: 30.8 PG — SIGNIFICANT CHANGE UP (ref 27–34)
MCHC RBC-ENTMCNC: 32.5 GM/DL — SIGNIFICANT CHANGE UP (ref 32–36)
MCHC RBC-ENTMCNC: 33.8 GM/DL — SIGNIFICANT CHANGE UP (ref 32–36)
MCV RBC AUTO: 91.2 FL — SIGNIFICANT CHANGE UP (ref 80–100)
MCV RBC AUTO: 92.3 FL — SIGNIFICANT CHANGE UP (ref 80–100)
METAMYELOCYTES # FLD: 1 % — HIGH (ref 0–0)
MONOCYTES # BLD AUTO: 1.19 K/UL — HIGH (ref 0–0.9)
MONOCYTES # BLD AUTO: 1.56 K/UL — HIGH (ref 0–0.9)
MONOCYTES NFR BLD AUTO: 4.2 % — SIGNIFICANT CHANGE UP (ref 2–14)
MONOCYTES NFR BLD AUTO: 5 % — SIGNIFICANT CHANGE UP (ref 2–14)
NEUTROPHILS # BLD AUTO: 25.1 K/UL — HIGH (ref 1.8–7.4)
NEUTROPHILS # BLD AUTO: 28.71 K/UL — HIGH (ref 1.8–7.4)
NEUTROPHILS NFR BLD AUTO: 88 % — HIGH (ref 43–77)
NEUTROPHILS NFR BLD AUTO: 89.4 % — HIGH (ref 43–77)
NEUTS BAND # BLD: 4 % — SIGNIFICANT CHANGE UP (ref 0–8)
NRBC # BLD: 0 /100 WBCS — SIGNIFICANT CHANGE UP (ref 0–0)
NRBC # BLD: 0 /100 — SIGNIFICANT CHANGE UP (ref 0–0)
PLAT MORPH BLD: NORMAL — SIGNIFICANT CHANGE UP
PLATELET # BLD AUTO: 267 K/UL — SIGNIFICANT CHANGE UP (ref 150–400)
PLATELET # BLD AUTO: 268 K/UL — SIGNIFICANT CHANGE UP (ref 150–400)
POTASSIUM SERPL-MCNC: 4.2 MMOL/L — SIGNIFICANT CHANGE UP (ref 3.5–5.3)
POTASSIUM SERPL-MCNC: 4.4 MMOL/L — SIGNIFICANT CHANGE UP (ref 3.5–5.3)
POTASSIUM SERPL-SCNC: 4.2 MMOL/L — SIGNIFICANT CHANGE UP (ref 3.5–5.3)
POTASSIUM SERPL-SCNC: 4.4 MMOL/L — SIGNIFICANT CHANGE UP (ref 3.5–5.3)
PROT SERPL-MCNC: 5.5 G/DL — LOW (ref 6–8.3)
PROT SERPL-MCNC: 5.6 G/DL — LOW (ref 6–8.3)
PROTHROM AB SERPL-ACNC: 10.2 SEC — LOW (ref 10.5–13.4)
PROTHROM AB SERPL-ACNC: 10.3 SEC — LOW (ref 10.5–13.4)
RBC # BLD: 3.18 M/UL — LOW (ref 3.8–5.2)
RBC # BLD: 3.37 M/UL — LOW (ref 3.8–5.2)
RBC # FLD: 13.1 % — SIGNIFICANT CHANGE UP (ref 10.3–14.5)
RBC # FLD: 13.2 % — SIGNIFICANT CHANGE UP (ref 10.3–14.5)
RBC BLD AUTO: SIGNIFICANT CHANGE UP
SMUDGE CELLS # BLD: PRESENT — SIGNIFICANT CHANGE UP
SODIUM SERPL-SCNC: 132 MMOL/L — LOW (ref 135–145)
SODIUM SERPL-SCNC: 133 MMOL/L — LOW (ref 135–145)
SOURCE GROUP B STREP: SIGNIFICANT CHANGE UP
URATE SERPL-MCNC: 5 MG/DL — SIGNIFICANT CHANGE UP (ref 2.5–7)
URATE SERPL-MCNC: 5.1 MG/DL — SIGNIFICANT CHANGE UP (ref 2.5–7)
WBC # BLD: 28.12 K/UL — HIGH (ref 3.8–10.5)
WBC # BLD: 31.21 K/UL — HIGH (ref 3.8–10.5)
WBC # FLD AUTO: 28.12 K/UL — HIGH (ref 3.8–10.5)
WBC # FLD AUTO: 31.21 K/UL — HIGH (ref 3.8–10.5)

## 2022-08-24 RX ORDER — IBUPROFEN 200 MG
600 TABLET ORAL EVERY 6 HOURS
Refills: 0 | Status: DISCONTINUED | OUTPATIENT
Start: 2022-08-24 | End: 2022-08-25

## 2022-08-24 RX ORDER — MAGNESIUM SULFATE 500 MG/ML
2 VIAL (ML) INJECTION
Qty: 40 | Refills: 0 | Status: DISCONTINUED | OUTPATIENT
Start: 2022-08-24 | End: 2022-08-25

## 2022-08-24 RX ADMIN — Medication 300 MILLIGRAM(S): at 04:05

## 2022-08-24 RX ADMIN — Medication 975 MILLIGRAM(S): at 00:56

## 2022-08-24 RX ADMIN — Medication 975 MILLIGRAM(S): at 06:22

## 2022-08-24 RX ADMIN — Medication 975 MILLIGRAM(S): at 00:14

## 2022-08-24 RX ADMIN — SODIUM CHLORIDE 3 MILLILITER(S): 9 INJECTION INTRAMUSCULAR; INTRAVENOUS; SUBCUTANEOUS at 13:26

## 2022-08-24 RX ADMIN — SODIUM CHLORIDE 3 MILLILITER(S): 9 INJECTION INTRAMUSCULAR; INTRAVENOUS; SUBCUTANEOUS at 22:23

## 2022-08-24 RX ADMIN — Medication 975 MILLIGRAM(S): at 23:41

## 2022-08-24 RX ADMIN — Medication 300 MILLIGRAM(S): at 15:46

## 2022-08-24 RX ADMIN — Medication 975 MILLIGRAM(S): at 05:36

## 2022-08-24 NOTE — PROGRESS NOTE ADULT - ATTENDING COMMENTS
pt seen and evaluated    doing well other than mild effects from magnesium infusion    bp's stable  labs stable    fundus firm  lochia light    plan dc Mg after 24 hours  monitor bp's   routine pp care otherwise

## 2022-08-25 VITALS — SYSTOLIC BLOOD PRESSURE: 125 MMHG | HEART RATE: 79 BPM | DIASTOLIC BLOOD PRESSURE: 75 MMHG

## 2022-08-25 PROCEDURE — 87653 STREP B DNA AMP PROBE: CPT

## 2022-08-25 PROCEDURE — 86901 BLOOD TYPING SEROLOGIC RH(D): CPT

## 2022-08-25 PROCEDURE — 36415 COLL VENOUS BLD VENIPUNCTURE: CPT

## 2022-08-25 PROCEDURE — 83735 ASSAY OF MAGNESIUM: CPT

## 2022-08-25 PROCEDURE — 86769 SARS-COV-2 COVID-19 ANTIBODY: CPT

## 2022-08-25 PROCEDURE — 82962 GLUCOSE BLOOD TEST: CPT

## 2022-08-25 PROCEDURE — G0463: CPT

## 2022-08-25 PROCEDURE — 86850 RBC ANTIBODY SCREEN: CPT

## 2022-08-25 PROCEDURE — 84550 ASSAY OF BLOOD/URIC ACID: CPT

## 2022-08-25 PROCEDURE — 88307 TISSUE EXAM BY PATHOLOGIST: CPT

## 2022-08-25 PROCEDURE — 85730 THROMBOPLASTIN TIME PARTIAL: CPT

## 2022-08-25 PROCEDURE — 85384 FIBRINOGEN ACTIVITY: CPT

## 2022-08-25 PROCEDURE — 85610 PROTHROMBIN TIME: CPT

## 2022-08-25 PROCEDURE — 80053 COMPREHEN METABOLIC PANEL: CPT

## 2022-08-25 PROCEDURE — 59025 FETAL NON-STRESS TEST: CPT

## 2022-08-25 PROCEDURE — 82570 ASSAY OF URINE CREATININE: CPT

## 2022-08-25 PROCEDURE — 86900 BLOOD TYPING SEROLOGIC ABO: CPT

## 2022-08-25 PROCEDURE — 59050 FETAL MONITOR W/REPORT: CPT

## 2022-08-25 PROCEDURE — 85025 COMPLETE CBC W/AUTO DIFF WBC: CPT

## 2022-08-25 PROCEDURE — 88305 TISSUE EXAM BY PATHOLOGIST: CPT

## 2022-08-25 PROCEDURE — 85027 COMPLETE CBC AUTOMATED: CPT

## 2022-08-25 PROCEDURE — 86780 TREPONEMA PALLIDUM: CPT

## 2022-08-25 PROCEDURE — 84156 ASSAY OF PROTEIN URINE: CPT

## 2022-08-25 PROCEDURE — 83615 LACTATE (LD) (LDH) ENZYME: CPT

## 2022-08-25 PROCEDURE — 81001 URINALYSIS AUTO W/SCOPE: CPT

## 2022-08-25 RX ORDER — LABETALOL HCL 100 MG
1 TABLET ORAL
Qty: 0 | Refills: 0 | DISCHARGE

## 2022-08-25 RX ORDER — LABETALOL HCL 100 MG
1 TABLET ORAL
Qty: 60 | Refills: 0
Start: 2022-08-25 | End: 2022-09-23

## 2022-08-25 RX ORDER — ASPIRIN/CALCIUM CARB/MAGNESIUM 324 MG
0 TABLET ORAL
Qty: 0 | Refills: 0 | DISCHARGE

## 2022-08-25 RX ADMIN — Medication 300 MILLIGRAM(S): at 14:35

## 2022-08-25 RX ADMIN — Medication 975 MILLIGRAM(S): at 00:20

## 2022-08-25 RX ADMIN — Medication 300 MILLIGRAM(S): at 02:30

## 2022-08-25 NOTE — DISCHARGE NOTE OB - CARE PROVIDER_API CALL
Aaron Pacheco (MD)  Obstetrics and Gynecology  [address], [address]  [place] [id number]  Phone: [phone]  Fax: [phone]  Follow Up Time:

## 2022-08-25 NOTE — DISCHARGE NOTE OB - MEDICATION SUMMARY - MEDICATIONS TO TAKE
I will START or STAY ON the medications listed below when I get home from the hospital:    labetalol 300 mg oral tablet  -- 1 tab(s) by mouth 2 times a day   -- It is very important that you take or use this exactly as directed.  Do not skip doses or discontinue unless directed by your doctor.  May cause drowsiness or dizziness.  Some non-prescription drugs may aggravate your condition.  Read all labels carefully.  If a warning appears, check with your doctor before taking.    -- Indication: For Blood pressure

## 2022-08-25 NOTE — PROGRESS NOTE ADULT - SUBJECTIVE AND OBJECTIVE BOX
R3 OB Postpartum Progress Note    Patient seen and examined at bedside, no acute overnight events. No acute complaints, pain well controlled. Patient is ambulating, voiding spontaneously, passing gas, and tolerating regular diet. Denies CP, SOB, N/V, HA, blurred vision, epigastric pain.    Vital Signs Last 24 Hours  T(C): 36.9 (08-25-22 @ 06:00), Max: 37.1 (08-24-22 @ 20:14)  HR: 71 (08-25-22 @ 06:00) (64 - 93)  BP: 123/76 (08-25-22 @ 06:00) (110/70 - 137/80)  RR: 18 (08-25-22 @ 06:00) (17 - 18)  SpO2: 98% (08-25-22 @ 06:00) (96% - 100%)    Physical Exam:  General: NAD  Abdomen: Soft, non-tender, non-distended, fundus firm  Pelvic: Lochia wnl    Labs:    Blood Type: A Positive  Antibody Screen: --  RPR: Negative               9.8    28.12 )-----------( 268      ( 08-24 @ 08:59 )             29.0                10.1   31.21 )-----------( 267      ( 08-24 @ 02:47 )             31.1                10.8   31.51 )-----------( 288      ( 08-23 @ 21:01 )             32.9         MEDICATIONS  (STANDING):  acetaminophen     Tablet .. 975 milliGRAM(s) Oral <User Schedule>  dextrose 50% Injectable 50 milliLiter(s) IV Push every 15 minutes  dextrose 50% Injectable 25 milliLiter(s) IV Push every 15 minutes  diphtheria/tetanus/pertussis (acellular) Vaccine (ADAcel) 0.5 milliLiter(s) IntraMuscular once  ibuprofen  Tablet. 600 milliGRAM(s) Oral every 6 hours  insulin regular Infusion 1 Unit(s)/Hr (1 mL/Hr) IV Continuous <Continuous>  labetalol 300 milliGRAM(s) Oral two times a day  magnesium sulfate Infusion 2 Gm/Hr (50 mL/Hr) IV Continuous <Continuous>  magnesium sulfate Infusion 2 Gm/Hr (50 mL/Hr) IV Continuous <Continuous>  oxytocin Infusion 333.333 milliUNIT(s)/Min (1000 mL/Hr) IV Continuous <Continuous>  oxytocin Infusion 333.333 milliUNIT(s)/Min (1000 mL/Hr) IV Continuous <Continuous>  prenatal multivitamin 1 Tablet(s) Oral daily  sodium chloride 0.9% lock flush 3 milliLiter(s) IV Push every 8 hours    MEDICATIONS  (PRN):  benzocaine 20%/menthol 0.5% Spray 1 Spray(s) Topical every 6 hours PRN for Perineal discomfort  dibucaine 1% Ointment 1 Application(s) Topical every 6 hours PRN Perineal discomfort  diphenhydrAMINE 25 milliGRAM(s) Oral every 6 hours PRN Pruritus  hydrocortisone 1% Cream 1 Application(s) Topical every 6 hours PRN Moderate Pain (4-6)  lanolin Ointment 1 Application(s) Topical every 6 hours PRN nipple soreness  magnesium hydroxide Suspension 30 milliLiter(s) Oral two times a day PRN Constipation  oxyCODONE    IR 5 milliGRAM(s) Oral every 3 hours PRN Moderate to Severe Pain (4-10)  oxyCODONE    IR 5 milliGRAM(s) Oral once PRN Moderate to Severe Pain (4-10)  pramoxine 1%/zinc 5% Cream 1 Application(s) Topical every 4 hours PRN Moderate Pain (4-6)  simethicone 80 milliGRAM(s) Chew every 4 hours PRN Gas  witch hazel Pads 1 Application(s) Topical every 4 hours PRN Perineal discomfort  
OB Progress Note: VAVD PPD#1    S: 42yo PPD#1 s/p VAVD. Patient feels well. Pain is well controlled. She is tolerating a regular diet and passing flatus. She is voiding spontaneously, and ambulating without difficulty. Denies CP/SOB. Denies lightheadedness/dizziness. Denies N/V.    O:  Vitals:  Vital Signs Last 24 Hrs  T(C): 36.5 (24 Aug 2022 06:16), Max: 37.5 (23 Aug 2022 23:55)  T(F): 97.7 (24 Aug 2022 06:16), Max: 99.5 (23 Aug 2022 23:55)  HR: 73 (24 Aug 2022 06:16) (57 - 98)  BP: 93/58 (24 Aug 2022 06:16) (89/56 - 144/93)  BP(mean): --  RR: 18 (24 Aug 2022 06:16) (16 - 20)  SpO2: 96% (24 Aug 2022 06:16) (86% - 100%)    Parameters below as of 24 Aug 2022 06:16  Patient On (Oxygen Delivery Method): room air        MEDICATIONS  (STANDING):  acetaminophen     Tablet .. 975 milliGRAM(s) Oral <User Schedule>  dextrose 50% Injectable 50 milliLiter(s) IV Push every 15 minutes  dextrose 50% Injectable 25 milliLiter(s) IV Push every 15 minutes  diphtheria/tetanus/pertussis (acellular) Vaccine (ADAcel) 0.5 milliLiter(s) IntraMuscular once  ibuprofen  Tablet. 600 milliGRAM(s) Oral every 6 hours  insulin regular Infusion 1 Unit(s)/Hr (1 mL/Hr) IV Continuous <Continuous>  labetalol 300 milliGRAM(s) Oral two times a day  magnesium sulfate Infusion 2 Gm/Hr (50 mL/Hr) IV Continuous <Continuous>  magnesium sulfate Infusion 2 Gm/Hr (50 mL/Hr) IV Continuous <Continuous>  oxytocin Infusion 333.333 milliUNIT(s)/Min (1000 mL/Hr) IV Continuous <Continuous>  oxytocin Infusion 333.333 milliUNIT(s)/Min (1000 mL/Hr) IV Continuous <Continuous>  prenatal multivitamin 1 Tablet(s) Oral daily  sodium chloride 0.9% lock flush 3 milliLiter(s) IV Push every 8 hours      Labs:  Blood type: A Positive  Rubella IgG: RPR: Negative                          10.1<L>   31.21<H> >-----------< 267    ( 08-24 @ 02:47 )             31.1<L>                        10.8<L>   31.51<H> >-----------< 288    ( 08-23 @ 21:01 )             32.9<L>                        12.8   12.65<H> >-----------< 317    ( 08-23 @ 04:02 )             38.7                        12.9   13.79<H> >-----------< 330    ( 08-22 @ 20:44 )             39.6                        13.2   11.69<H> >-----------< 338    ( 08-22 @ 14:48 )             40.5    08-24-22 @ 02:47      132<L>  |  100  |  17  ----------------------------<  151<H>  4.2   |  20<L>  |  0.57    08-23-22 @ 04:01      134<L>  |  102  |  11  ----------------------------<  134<H>  4.1   |  18<L>  |  0.57    08-22-22 @ 20:43      135  |  101  |  10  ----------------------------<  176<H>  4.8   |  20<L>  |  0.67    08-22-22 @ 14:48      138  |  103  |  12  ----------------------------<  70  4.1   |  21<L>  |  0.53        Ca    6.8<L>      24 Aug 2022 02:47  Ca    7.5<L>      23 Aug 2022 04:01  Ca    8.5      22 Aug 2022 20:43  Ca    10.2      22 Aug 2022 14:48  Mg     5.6<H>     08-24  Mg     5.6<H>     08-23  Mg     6.4<H>     08-23  Mg     5.6<H>     08-23  Mg     5.4<H>     08-23  Mg     5.0<H>     08-22    TPro  5.6<L>  /  Alb  2.9<L>  /  TBili  0.2  /  DBili  x   /  AST  20  /  ALT  11  /  AlkPhos  92  08-24-22 @ 02:47  TPro  6.9  /  Alb  3.6  /  TBili  0.2  /  DBili  x   /  AST  16  /  ALT  13  /  AlkPhos  119  08-23-22 @ 04:01  TPro  6.9  /  Alb  3.8  /  TBili  0.2  /  DBili  x   /  AST  14  /  ALT  14  /  AlkPhos  124<H>  08-22-22 @ 20:43  TPro  7.1  /  Alb  3.9  /  TBili  0.2  /  DBili  x   /  AST  15  /  ALT  14  /  AlkPhos  124<H>  08-22-22 @ 14:48          Physical Exam:  General: NAD  Abdomen: soft, non-tender, non-distended, fundus firm  Vaginal: Lochia wnl  Extremities: No erythema/edema

## 2022-08-25 NOTE — DISCHARGE NOTE OB - MEDICATION SUMMARY - MEDICATIONS TO STOP TAKING
I will STOP taking the medications listed below when I get home from the hospital:    labetalol 100 mg oral tablet  -- 1 tab(s) by mouth 2 times a day    aspirin 81 mg oral tablet

## 2022-08-25 NOTE — PROGRESS NOTE ADULT - ATTENDING COMMENTS
Doing well  VSS afeb  Abd S NT ND FF min lochia  S/P VAVD, s/p siPEC at 35w, stable for dc home  Instruc reviewed  BP monitoring at home and parameters reviewed. Will cont Lab 300 BID for now and call me next week with her BP values taken BID.  Fu 6 wks

## 2022-08-25 NOTE — DISCHARGE NOTE OB - PAIN IN THE CALVES OF YOUR LEGS
From: Sergo Agarwal  Sent: 10/30/2018 6:36 PM EDT  Subject: Medication Renewal Request    Tacos Shaheen.  [facility] would like a refill of the following medications:     oxyCODONE-acetaminophen (PERCOCET) 5-325 MG per tablet Tiera Ng, DO]   Patient Comment: thank you    Preferred pharmacy: [address], [address] [place], [place] [id number] - F [phone] Statement Selected

## 2022-08-25 NOTE — DISCHARGE NOTE OB - PATIENT PORTAL LINK FT
You can access the FollowMyHealth Patient Portal offered by [facility] by registering at the following website: [email or web address]. By joining [facility]’s FollowMyHealth portal, you will also be able to view your health information using other applications (apps) compatible with our system.

## 2022-08-25 NOTE — DISCHARGE NOTE OB - CARE PLAN
Principal Discharge DX:	Vacuum-assisted vaginal delivery  Assessment and plan of treatment:	As discussed   1

## 2022-08-25 NOTE — PROGRESS NOTE ADULT - ASSESSMENT
A/P: 42yo PPD#1 s/p VAVD c/b sPEC on Mg.   Patient is doing well postpartum.     #sPEC  - Continue Mg 24h postpartum   - Labetalol 300 BID  - f/u HELLP labs  - BP monitoring     #Maternal wellbeing  - Pain well controlled, continue current pain regimen  - Increase ambulation, SCDs when not ambulating  - Continue regular diet  - Bowel regimen prn     Paris Braun, PGY3
42yo PPD#2 s/p VAVD c/b sPEC s/p Mg. Patient is in stable condition, VSS, doing well postpartum.     #sPEC  - s/p Mg 24h postpartum   - Labetalol 300 BID  - BP monitoring     #Maternal wellbeing  - Pain well controlled, continue current pain regimen  - Increase ambulation, SCDs when not ambulating  - Continue regular diet  - Bowel regimen prn     [place] PGY3

## 2022-09-28 LAB — SURGICAL PATHOLOGY STUDY: SIGNIFICANT CHANGE UP

## 2024-01-05 NOTE — OB RN PATIENT PROFILE - FUNCTIONAL SCREEN CURRENT LEVEL: COMMUNICATION, MLM
2024       Eri Belle, DO  [address] [facility] [address]  Via In Basket      Patient: Bing Ac   YOB: 2004   Date of Visit: 2024       Dear Dr. Belle:    I saw your patient, Bing Ac, for an evaluation. Below are my notes for this visit with her.    If you have questions, please do not hesitate to call me.      Sincerely,        Michelle Murphy MD        CC: No Recipients  Michelle Murphy MD  2024 12:24 PM  Cosign Needed  Endocrinology Office Visit     Bing Ac  24  [id number]    CC:   Chief Complaint   Patient presents with   • Diabetes Mellitus     Smbg-6xs daily. EE has apt  ( [facility] ) pt 32weeks pregnant    • Office Visit     Pt refused flu and pnumo        Referring Provider: Dr Cifuentes    PCP: Dr Cifuentes     HPI/To Review  19 year old female presents for evaluation of T1DM, patient is pregnant      Established care in our office on 2023 at the request of mazin carrasco and PCP as she is pregnant and unable to be seen by mazin carrasco due to pregnancy    During prior office visits since establishing care in our clinic, glycemic goals in pregnancy were discussed in pregnancy including but not limited to  labor, fetal macrosomia, preeclampsia and eclampsia, DKA, hypoglycemia during the last office visit.  She was poorly controlled prior to pregnancy, and she remains at high risk for complications due to history of DM control.  At a prior office visit, the decision was made to leave her insulin pump in automode, although not recommended for pregnancy. The reason being is her BS are HIGHER off automode.  Insulin pump settings were also modified during that visit.     INTERVAL EVENTS    Last seen in our clinic 2023.     Doing well no recent hospitalization.   MELANIE around     Seeing MFM regularly. Already has 2 appointment set up for  and  with us and .     She has also been evaluated  by ophthalmology, no evidence of retinopathy, they requesting that she return in 3 months for close follow-up.    On Tandem and Dexcom 6    Ave ,   TIR 50%   High 30%  Very high 19%  Low 1%  Very low 0%    Time active control Iq 94%  CGM inactive 5%  Pump inactive 1%  SD 76 mg/dl  COV 42%  GMI 7.7%    Average daily dose 89.78  Basal 53.46 60%  Bolus 36.32 40%  Average daily boluses 9  Manual 3 boluses 37%  Control IQ 6 boluses 63%  Average daily carbs 91g  Diabetes History:  Diagnosed with T1DM  In 2018, presented with DKA    Component      Latest Ref Rng 2018  4:42 PM 2018  5:10 PM   INSULIN, SERUM      munit/L 2 (E)    C-PEPTIDE      0.8 - 3.9 ng/mL 0.5 (L) (E)    Insulin Antibody  <0.4 (E)   Islet Cell Antibody 1:64 ! (E)    Glutamic Acid Decarboxylase AB  >250.0 (H) (E)       DKA 3-4 times since diagnosis      Last DKA >1 year ago         Previous DM medications:    Current insulin pump settings  -tandem insulin pump with dexcom G6 since   -humalog on insulin pump up to 100 units per day   Control IQ     Basal   12a 2.16  6a 1.8  9p 1.8     ICR   12a 1:7  6a 1:4  9p 1:6    ISF  12a 20  6a 20  9p 20     BG target 120  AIT 3 hours     Glucose Trends: Postprandial hyperglycemia, mostly after 4 pm   She is giving herself mealtime bolus most times.   Receiving correction boluses via control IQ     Weight: Body mass index is 35.73 kg/m².      HbA1c: 7.5% 10/2023 ,7.9% 2023, 8.3% 2022, 10.6% 2022, 10.8% 2021    Polyuria denies   Polydipsia denies   Blurry vision denies   Paresthesias denies     Hypoglycemia awareness: yes, around 70     DM microvascular complications:   -Neuropathy: no  -Retinopathy: No, up-to-date with eye exam  -Nephropathy: no, last MALB/Cr   Microalbumin/ Creatinine Ratio (mg/g)   Date Value   2022 4.8       DM macrovascular complications:  -HTN: no  -HLD: no  -CAD: no  -CVA/TIA: no  -PAD/PVD: no    Hospitalizations related to DM: yes, see above      History of thyroid disease: no   TSH (mcUnits/mL)   Date Value   11/30/2022 1.902     Component      Latest Ref Rng 11/30/2022  6:06 PM   Tissue Transglutaminase Antibody IgA      <20 Units 4        Component      Latest Ref Rng 2/27/2018  4:13 PM   Thyroglobulin Antibody      0.0 - 4.0 unit/mL <0.9 (E)   THYROID PEROXIDASE AB      <60 unit/mL 45 (E)        PMH/PSH  Past Medical History:   Diagnosis Date   • Annual physical exam 03/14/2022   • Chlamydia infection affecting pregnancy in second trimester 10/31/2023   • Diabetes mellitus (CMD) 2018    type 1 dm   • Eczema 03/22/2017   • Encounter for surveillance of transdermal patch hormonal contraceptive device 03/14/2022   • Hypoglycemia 03/21/2018   • Menorrhagia    • Other chest pain 12/06/2022   • Screen for STD (sexually transmitted disease) 03/14/2022   • Trichomonas infection 10/31/2023    10-31-23 Rx for metronidazole bid x7 days, Rx for partner sent to pharmacy.     Past Surgical History:   Procedure Laterality Date   • Appendectomy  2020     Patient Active Problem List   Diagnosis   • Overweight   • Pre-existing type 1 diabetes affecting pregnancy, antepartum   • Insulin pump titration   • Insulin pump status   • High risk teen pregnancy   • Supervision of high risk pregnancy, antepartum   • Hyperglycemia   • Elevated blood sugar   • Chlamydia infection affecting pregnancy in second trimester   • Trichomonas infection   • GBS bacteriuria       FH  Family History   Problem Relation Age of Onset   • Patient is unaware of any medical problems Mother    • Hypertension Father    • Patient is unaware of any medical problems Sister    • Patient is unaware of any medical problems Brother    • Cirrhosis Maternal Grandfather    • Diabetes Maternal Grandfather    • Cancer Maternal Grandfather    • Autoimmune Disease Paternal Grandmother    mother with preeclampsia     Social Hx  Negative for etoh  Negative for illicits  Negative for tobacco  High school student      ALLERGIES:   Allergen Reactions   • Crab (Diagnostic) RASH   • Shellfish Allergy   (Food Or Med) HIVES         ROS  A 12 Point ROS was negative except that listed in the HPI    Meds  Current Outpatient Medications   Medication Sig   • Insulin Lispro, 1 Unit Dial, (HumaLOG KwikPen) 100 UNIT/ML pen-injector [None received]   • famotidine (PEPCID) 20 MG tablet Take 1 tablet by mouth nightly as needed (Heartburn).   • ondansetron (ZOFRAN ODT) 4 MG disintegrating tablet Place 1 tablet onto the tongue every 8 hours as needed for Nausea.   • aspirin (ECOTRIN) 81 MG EC tablet Take 1 tablet by mouth daily.   • Continuous Blood Gluc Sensor (Dexcom G6 Sensor) Misc CHANGE SENSOR EVERY 10 DAYS   • insulin detemir (Levemir FlexPen) 100 UNIT/ML pen-injector Indications: Insulin-Dependent Diabetes, Pregnancy Prime 2 units before each dose. Inject 46 units daily in case pump fails   • insulin lispro (HumaLOG) 100 UNIT/ML injectable solution INJECT UP  UNITS DAILY VIA PUMP   • Continuous Blood Gluc Transmit (Dexcom G6 Transmitter) Misc 1 each every 3 months. Change transmitter every 90 days.   • TRUEplus 5-Bevel Pen Needles 32G X 4 MM Misc Use to inject insulin 4-6 times daily. Remove needle cover(s) to expose needle before injecting.   • Prenatal Vit-Fe Sulfate-FA-DHA (Prenatal Vitamin/Min +DHA) 27-0.8-200 MG Cap Take 1 capsule by mouth daily.   • blood glucose (OneTouch Verio) test strip Test blood sugar 4 times daily. Diagnosis: Type 1 dm in pregnancy Meter: One Touch Verio.   • Lancets (OneTouch Delica Plus [id number]) Misc 1 each 4 times daily.   • Gvoke HypoPen 2-Pack 1 MG/0.2ML Solution Auto-injector INJECT 1MG SUBCUTANEOUSLY AS NEEDED . MAY REPEAT ONCE IN 15 MINUTES (FOR SEVERE HYPOGLYCEMIA/SEIZURES)   • Ketostix strip Use to check urine for ketones when BG is > 300 twice in a row, 2 hours apart or when ill     No current facility-administered medications for this visit.       Exam  Visit Vitals  BP 99/61   Pulse 90    Ht 5' 5\" (1.651 m)   Wt 97.4 kg (214 lb 11.7 oz)   LMP 05/16/2023 (Approximate)   BMI 35.73 kg/m²     Vitals Reviewed  General NAD, NCAT +acanthosis   HEENT- no exophthalmos   CV S1, S2, RRR  Resp CTA bilaterally, no wheezes rales rhonchi  Abd soft nontender nondistended,  lipohypertrophy is present in RLQ and LLQ abdomen  Ext no edema   MSK normal ROM   Skin no rashes, no ecchymoses  Neuro alert and oriented x 3   Psych cooperative noncombative    Labs/Diagnostics   -Reviewed in [facility]/Care Everywhere  The Diabetes Tests flowsheet was reviewed.  Sodium (mmol/L)   Date Value   11/21/2023 137     Potassium (mmol/L)   Date Value   11/21/2023 3.2 (L)     Chloride (mmol/L)   Date Value   11/21/2023 107     Glucose (mg/dL)   Date Value   11/21/2023 126 (H)     Calcium (mg/dL)   Date Value   11/21/2023 8.2     Carbon Dioxide (mmol/L)   Date Value   11/21/2023 22     BUN (mg/dL)   Date Value   11/21/2023 8     Creatinine (mg/dL)   Date Value   11/21/2023 0.45     Cholesterol (mg/dL)   Date Value   11/30/2022 189 (H)     HDL (mg/dL)   Date Value   11/30/2022 78     Cholesterol/ HDL Ratio (no units)   Date Value   11/30/2022 2.4     Triglycerides (mg/dL)   Date Value   11/30/2022 100 (H)     LDL (mg/dL)   Date Value   11/30/2022 91     Hemoglobin A1C (%)   Date Value   10/11/2023 7.5 (H)     Microalbumin/ Creatinine Ratio (mg/g)   Date Value   11/30/2022 4.8     WBC (K/mcL)   Date Value   10/25/2023 6.2     RBC (mil/mcL)   Date Value   10/25/2023 3.35 (L)     HCT (%)   Date Value   10/25/2023 29.9 (L)     HGB (g/dL)   Date Value   10/25/2023 10.0 (L)     PLT (K/mcL)   Date Value   10/25/2023 142     Assessment/Plan    #T1DM with hyperglycemia  -currently pregnant, Third trimester per patient MELANIE 2/25/24  Hemoglobin A1C (%)   Date Value   10/11/2023 7.5 (H)       insulin pump settings modified today (in bold)  -tandem insulin pump with dexcom G6 since 2022  -humalog on insulin pump up to 100 units per day     Control IQ      Basal   12a 2.16--> 2.37  6a 1.8  9p 1.8     ICR   12a 7--> 6  6a 4-->3  9p 6--> 5    ISF  12a  20--> 18  6a   20 --> 18  9p   20--> 18    BG target 110  AIT 3 hours     -Continue current insulin pump settings. Patient was advised to bolus 10-15 minutes BEFORE meals.   -goal HbA1c is 6% without frequent lows due to pregnancy.  Most recent A1c is 7.5% 10/2023. Next A1c due 2024  -we discussed glycemic goals with pregnancy, fbs 70-95 and 2 hours post meals <120  -we discussed potential complications of poorly controlled DM in pregnancy including but not limited to  labor, fetal macrosomia, preeclampsia and eclampsia, DKA, hypoglycemia during the last office visit  -Patient advised to wear her insulin pump without interruptions, even if she is not wearing her Dexcom sensor.  She should switch to manual mode if not wearing the sensor, and should switch back to auto mode if she has sensor in place.  - Lipohypertrophy present, change tandem site q3 days, alternate site  -Reviewed basal/bolus regimen, if pump malfunctions.  She should take Lantus 45 units daily. Humalog with carb ratio of 1:4.  SSI with meals 1:25>100.   -MBG premeals and HS, goal BS is .  If she is using fingerstick checks, she needs to write down her BG readings and bring logs to next office visit.   - I advised if she is feeling hypoglycemic, then to proceed with treatment for hypoglycemia   -she is up to date with her diabetes eye exam, no evidence of retinopathy   -discussed if BS are not coming down with insulin pump, to change site and give external injection   -needs to carry glucagon at all times, has rx for glucagon, patient confirmed that she received it  -she is at high risk for complications due to history of DM control and this was discussed with her   -reviewed appropriate med administration technique  -to call if BS is <70 or >300  -bring log and meter to each visit   -discussed diet modifications   -discussed treatment  for hypoglycemia with glucose tabs and/or 4oz of juice and wait 15 min to recheck BS and to repeat until BS>80  -relevant labs reviewed in [facility]/CareEverywhere    DM HM:   -ophtho: up to date, optho visit 12/2023  -renal: urine microalbumin levels ordered today  -bp: at goal   -hold off on lipid screening   -feet: stable     Incidental pregnancy  -Following with MFM  -mother with history of preeclampsia     RTC in 3 weeks in endocrine clinic, appointments has been scheduled. Will also need to schedule for either April or May    Thank you, Dr. Cifuentes, for allowing me to participate in the care of this patient!      Patient seen and discussed with attending Dr. Abdoulaye Murphy MD  PGY5/Endocrinology Fellow     0 = understands/communicates without difficulty
